# Patient Record
Sex: FEMALE | Race: WHITE | NOT HISPANIC OR LATINO | Employment: FULL TIME | URBAN - METROPOLITAN AREA
[De-identification: names, ages, dates, MRNs, and addresses within clinical notes are randomized per-mention and may not be internally consistent; named-entity substitution may affect disease eponyms.]

---

## 2022-06-06 ENCOUNTER — OFFICE VISIT (OUTPATIENT)
Dept: INTERNAL MEDICINE CLINIC | Facility: CLINIC | Age: 32
End: 2022-06-06
Payer: COMMERCIAL

## 2022-06-06 VITALS
BODY MASS INDEX: 17.97 KG/M2 | SYSTOLIC BLOOD PRESSURE: 114 MMHG | DIASTOLIC BLOOD PRESSURE: 64 MMHG | RESPIRATION RATE: 16 BRPM | HEART RATE: 83 BPM | WEIGHT: 111.8 LBS | HEIGHT: 66 IN | TEMPERATURE: 98.4 F | OXYGEN SATURATION: 99 %

## 2022-06-06 DIAGNOSIS — R59.0 LYMPHADENOPATHY, PREAURICULAR: Primary | ICD-10-CM

## 2022-06-06 DIAGNOSIS — R68.84 JAW PAIN, NON-TMJ: ICD-10-CM

## 2022-06-06 PROCEDURE — 3725F SCREEN DEPRESSION PERFORMED: CPT | Performed by: INTERNAL MEDICINE

## 2022-06-06 PROCEDURE — 1036F TOBACCO NON-USER: CPT | Performed by: INTERNAL MEDICINE

## 2022-06-06 PROCEDURE — 99203 OFFICE O/P NEW LOW 30 MIN: CPT | Performed by: INTERNAL MEDICINE

## 2022-06-06 PROCEDURE — 3008F BODY MASS INDEX DOCD: CPT | Performed by: INTERNAL MEDICINE

## 2022-06-06 RX ORDER — DOXYCYCLINE HYCLATE 20 MG
20 TABLET ORAL 2 TIMES DAILY WITH MEALS
COMMUNITY
Start: 2022-05-15

## 2022-06-06 RX ORDER — MULTIVITAMIN
1 TABLET ORAL DAILY
COMMUNITY

## 2022-06-06 RX ORDER — NORGESTIMATE AND ETHINYL ESTRADIOL
1 KIT DAILY
COMMUNITY
Start: 2022-05-26

## 2022-06-06 NOTE — PROGRESS NOTES
Assessment/Plan:    Problem List Items Addressed This Visit        Immune and Lymphatic    Lymphadenopathy, preauricular - Primary     -chronic L sided tender palpable nodule at TMJ area  -possible LN  -obtain CT  -obtain CBCD, BMP           Relevant Orders    CT facial bones w wo contrast    Basic metabolic panel    CBC and differential       Other    Jaw pain, non-TMJ    Relevant Orders    CT facial bones w wo contrast          Subjective:      Patient ID: Judith Cloud is a 36 y o  female  HPI  36yo female here as a new patient  Has lump on L jaw area  Used to be a dull pain intermittent x1 year  Now constant dull ache, no TMD   Hurts when she presses on it  No swelling, bruxism, tinnitus  She does not take anyting for it, rates it 5 out of 10  No chewing difficulty or talking  Feels that it has enlarged but not significantly over the past 3 months  She is a dentist, 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  The following portions of the patient's history were reviewed and updated as appropriate: allergies, current medications, past family history, past medical history, past social history, past surgical history and problem list       Current Outpatient Medications:     Multiple Vitamin (multivitamin) tablet, Take 1 tablet by mouth daily, Disp: , Rfl:     tretinoin (RETIN-A) 0 025 % cream, APPLY A PEA SIZED AMOUNT EVERY NIGHT AT BEDTIME  WAIT AT LEAST 5 MINUTES AFTER WASHING FACE, Disp: , Rfl:     Tri-Lo-Yamilet 0 18/0 215/0 25 MG-25 MCG per tablet, Take 1 tablet by mouth daily, Disp: , Rfl:     doxycycline (PERIOSTAT) 20 MG tablet, Take 20 mg by mouth 2 (two) times a day with meals (Patient not taking: Reported on 6/6/2022), Disp: , Rfl:     Review of Systems   Constitutional: Negative for appetite change, fever and unexpected weight change  HENT: Negative for dental problem, ear pain, facial swelling, hearing loss, postnasal drip, rhinorrhea, sore throat and tinnitus      Skin: Negative for color change, pallor, rash and wound  Neurological: Negative for numbness and headaches  Hematological: Negative for adenopathy  Objective:    /64   Pulse 83   Temp 98 4 °F (36 9 °C)   Resp 16   Ht 5' 5 5" (1 664 m)   Wt 50 7 kg (111 lb 12 8 oz)   SpO2 99%   BMI 18 32 kg/m²      Physical Exam  Vitals reviewed  Constitutional:       General: She is not in acute distress  Appearance: She is not diaphoretic  HENT:      Head: Normocephalic  Right Ear: Tympanic membrane, ear canal and external ear normal  There is no impacted cerumen  Left Ear: Tympanic membrane, ear canal and external ear normal  There is no impacted cerumen  Nose: Nose normal  No congestion or rhinorrhea  Mouth/Throat:      Mouth: Mucous membranes are moist       Pharynx: Oropharynx is clear  No oropharyngeal exudate or posterior oropharyngeal erythema  Comments: No jaw clicking noted  Chest:   Breasts:      Right: No supraclavicular adenopathy  Left: No supraclavicular adenopathy  Musculoskeletal:      Cervical back: Neck supple  No tenderness  Lymphadenopathy:      Head:      Right side of head: No submental, submandibular, preauricular, posterior auricular or occipital adenopathy  Left side of head: Preauricular (mobile, approx 1cm fluctuant, palpable node, tender to palpation) adenopathy present  No submental, submandibular, posterior auricular or occipital adenopathy  Cervical: No cervical adenopathy  Right cervical: No superficial, deep or posterior cervical adenopathy  Left cervical: No superficial, deep or posterior cervical adenopathy  Upper Body:      Right upper body: No supraclavicular adenopathy  Left upper body: No supraclavicular adenopathy  Neurological:      Mental Status: She is alert and oriented to person, place, and time

## 2022-08-15 ENCOUNTER — OFFICE VISIT (OUTPATIENT)
Dept: OBGYN CLINIC | Facility: CLINIC | Age: 32
End: 2022-08-15
Payer: COMMERCIAL

## 2022-08-15 VITALS
SYSTOLIC BLOOD PRESSURE: 94 MMHG | HEIGHT: 65 IN | WEIGHT: 106 LBS | DIASTOLIC BLOOD PRESSURE: 78 MMHG | BODY MASS INDEX: 17.66 KG/M2

## 2022-08-15 DIAGNOSIS — Z01.419 ROUTINE GYNECOLOGICAL EXAMINATION: Primary | ICD-10-CM

## 2022-08-15 DIAGNOSIS — Z11.3 SCREENING FOR STDS (SEXUALLY TRANSMITTED DISEASES): ICD-10-CM

## 2022-08-15 DIAGNOSIS — Z30.41 ENCOUNTER FOR SURVEILLANCE OF CONTRACEPTIVE PILLS: ICD-10-CM

## 2022-08-15 PROCEDURE — 99385 PREV VISIT NEW AGE 18-39: CPT | Performed by: PHYSICIAN ASSISTANT

## 2022-08-15 PROCEDURE — 87591 N.GONORRHOEAE DNA AMP PROB: CPT | Performed by: PHYSICIAN ASSISTANT

## 2022-08-15 PROCEDURE — 0503F POSTPARTUM CARE VISIT: CPT | Performed by: PHYSICIAN ASSISTANT

## 2022-08-15 PROCEDURE — G0145 SCR C/V CYTO,THINLAYER,RESCR: HCPCS | Performed by: PHYSICIAN ASSISTANT

## 2022-08-15 PROCEDURE — 87491 CHLMYD TRACH DNA AMP PROBE: CPT | Performed by: PHYSICIAN ASSISTANT

## 2022-08-15 RX ORDER — ISOTRETINOIN 30 MG/1
CAPSULE ORAL
COMMUNITY
Start: 2022-07-26

## 2022-08-15 RX ORDER — NORGESTIMATE AND ETHINYL ESTRADIOL
1 KIT DAILY
Qty: 90 TABLET | Refills: 4 | Status: SHIPPED | OUTPATIENT
Start: 2022-08-15

## 2022-08-15 NOTE — PROGRESS NOTES
Patient is here for annual exam   She denies any complaints  LMP: 7/18/22  Periods are regular  Periods last 4 days  Birth control: pills  She has completed the Gardasil series  She is sexually active  She does desire STD testing    Family history of breast, ovarian, uterine cancer or colon cancer: no

## 2022-08-15 NOTE — PROGRESS NOTES
Assessment   36 y o  Jay Barrios presenting for annual exam      Plan   Diagnoses and all orders for this visit:    1  Routine gynecological examination    - Liquid-based pap, screening    Normal findings on routine exam   Encouraged 150 min of exercise per week  Reviewed balanced diet  Multivitamin encouraged   Breast awareness/SBE encouraged       2  Encounter for surveillance of contraceptive pills    - Tri-Lo-Yamilet 0 18/0 215/0 25 MG-25 MCG per tablet; Take 1 tablet by mouth daily  Dispense: 90 tablet; Refill: 4    Withdrawal bleeds are light and regular on current contraceptive  She is happy with this and desires to continue  Aware of symptoms to report  Refill sent to pharmacy on file  3  Screening for STDs (sexually transmitted diseases)    - Chlamydia/GC amplified DNA by PCR  - Hepatitis B surface antigen; Future  - Hepatitis C antibody; Future  - HIV 1/2 Antigen/Antibody (4th Generation) w Reflex [facility]; Future  - RPR; Future        Pap done today      RTO one year for yearly exam or sooner as needed  __________________________________________________________________    Subjective     Maira Triplett is a 36 y o  Jay Barrios presenting for annual exam  She is without complaint and does want STD testing today  Mike Dorado is new to this practice  She is engaged - getting  next month  Small wedding - approx 80 people - she and maría would be happy with court wedding but both are first born children and parents would like formal ceremony/reception  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  SCREENING  Last Pap: Pt reports 2 years ago  Last Mammo: N/A  Last Colonoscopy: N/A      GYN  Gyn hx: no sig PMH reported   Menarche at age 17  Periods are regular every 28-30 days, lasting 4 days  Dysmenorrhea:none     Cyclic symptoms include none    Sexually active: Yes - single partner - male (fiance)  Contraception: Megha Mabry Hx STI: denies     Hx Abnormal pap: denies  We reviewed [facility] guidelines for Pap testing today  Gardasil: She has completed the Gardasil series  Denies vaginal discharge, itching, odor, dyspareunia, pelvic pain and vulvar/vaginal symptoms      OB     Does not desire fertility in near future  Thinking maybe they will freeze eggs to buy time as would prefer to wait a few years and  wants multiple children  Declines referral at this time but will call office if desires in future    Complaints: denies   Denies urgency, frequency, hematuria, leakage / change in stream, difficulty urinating  BREAST  Complaints: denies   Denies: breast lump, breast tenderness, nipple discharge, skin color change, and skin lesion(s)  Personal hx: none reported       Pertinent Family Hx:   Family hx of breast cancer: no  Family hx of ovarian cancer: no  Family hx of colon cancer: no      GENERAL  PMH reviewed/updated and is as below  Patient does follow with a PCP  Past Medical History:   Diagnosis Date    Acne        Past Surgical History:   Procedure Laterality Date    WISDOM TOOTH EXTRACTION           Current Outpatient Medications:     ISOtretinoin (ACCUTANE) 30 MG capsule, TAKE 1 CAPSULE BY MOUTH ONCE A DAY WITH FOOD IN THE MORNING, Disp: , Rfl:     Multiple Vitamin (multivitamin) tablet, Take 1 tablet by mouth daily, Disp: , Rfl:     Tri-Lo-Yamilet 0 18/0 215/0 25 MG-25 MCG per tablet, Take 1 tablet by mouth daily, Disp: 90 tablet, Rfl: 4    No Known Allergies    Social History     Socioeconomic History    Marital status: Single     Spouse name: Not on file    Number of children: Not on file    Years of education: Not on file    Highest education level: Not on file   Occupational History    Not on file   Tobacco Use    Smoking status: Never Smoker    Smokeless tobacco: Never Used   Vaping Use    Vaping Use: Never used   Substance and Sexual Activity    Alcohol use:  Yes Alcohol/week: 1 0 standard drink     Types: 1 Standard drinks or equivalent per week    Drug use: Never    Sexual activity: Yes     Partners: Male     Birth control/protection: Pill   Other Topics Concern    Not on file   Social History Narrative    Dentist     Social Determinants of Health     Financial Resource Strain: Not on file   Food Insecurity: Not on file   Transportation Needs: Not on file   Physical Activity: Not on file   Stress: Not on file   Social Connections: Not on file   Intimate Partner Violence: Not on file   Housing Stability: Not on file       Review of Systems     ROS:  Constitutional: Negative for fatigue and unexpected weight change  Respiratory: Negative for cough and shortness of breath  Cardiovascular: Negative for chest pain and palpitations  Gastrointestinal: Negative for abdominal pain and change in bowel habits  Breasts:  Negative, other than as noted above  Genitourinary: Negative, other than as noted above  Psychiatric: Negative for mood difficulties  Objective      BP 94/78 (BP Location: Left arm, Patient Position: Sitting, Cuff Size: Standard)   Ht 5' 4 75" (1 645 m)   Wt 48 1 kg (106 lb)   LMP 07/18/2022   BMI 17 78 kg/m²     Physical Examination:    Patient appears well and is not in distress  Neck is supple without masses  Breasts are symmetrical without mass, tenderness, nipple discharge, skin changes or adenopathy  Abdomen is soft and nontender without masses  External genitals are normal without lesions or rashes  Urethral meatus and urethra are normal  Bladder is normal to palpation  Vagina is normal without discharge or bleeding  Cervix is normal without discharge or lesion  Uterus is normal, mobile, nontender without palpable mass  Adnexa are normal, nontender, without palpable mass

## 2022-08-18 LAB
C TRACH DNA SPEC QL NAA+PROBE: NEGATIVE
LAB AP GYN PRIMARY INTERPRETATION: NORMAL
Lab: NORMAL
N GONORRHOEA DNA SPEC QL NAA+PROBE: NEGATIVE

## 2022-09-01 LAB
EXTERNAL HIV SCREEN: NORMAL
HCV AB SER-ACNC: NEGATIVE

## 2022-12-08 ENCOUNTER — OFFICE VISIT (OUTPATIENT)
Dept: INTERNAL MEDICINE CLINIC | Facility: CLINIC | Age: 32
End: 2022-12-08

## 2022-12-08 VITALS
SYSTOLIC BLOOD PRESSURE: 100 MMHG | BODY MASS INDEX: 19.33 KG/M2 | HEIGHT: 65 IN | RESPIRATION RATE: 14 BRPM | DIASTOLIC BLOOD PRESSURE: 66 MMHG | OXYGEN SATURATION: 100 % | TEMPERATURE: 97.6 F | HEART RATE: 68 BPM | WEIGHT: 116 LBS

## 2022-12-08 DIAGNOSIS — Z23 ENCOUNTER FOR IMMUNIZATION: ICD-10-CM

## 2022-12-08 DIAGNOSIS — Z13.6 SCREENING FOR CARDIOVASCULAR CONDITION: ICD-10-CM

## 2022-12-08 DIAGNOSIS — Z00.00 ANNUAL PHYSICAL EXAM: Primary | ICD-10-CM

## 2022-12-08 RX ORDER — ISOTRETINOIN 20 MG/1
20 CAPSULE, LIQUID FILLED ORAL
COMMUNITY
Start: 2022-11-14

## 2022-12-08 NOTE — PATIENT INSTRUCTIONS
Wellness Visit for Adults   AMBULATORY CARE:   A wellness visit  is when you see your healthcare provider to get screened for health problems  Your healthcare provider will also give you advice on how to stay healthy  Write down your questions so you remember to ask them  Ask your healthcare provider how often you should have a wellness visit  What happens at a wellness visit:  Your healthcare provider will ask about your health, and your family history of health problems  This includes high blood pressure, heart disease, and cancer  He or she will ask if you have symptoms that concern you, if you smoke, and about your mood  You may also be asked about your intake of medicines, supplements, food, and alcohol  Any of the following may be done:  · Your weight  will be checked  Your height may also be checked so your body mass index (BMI) can be calculated  Your BMI shows if you are at a healthy weight  · Your blood pressure  and heart rate will be checked  Your temperature may also be checked  · Blood and urine tests  may be done  Blood tests may be done to check your cholesterol levels  Abnormal cholesterol levels increase your risk for heart disease and stroke  You may also need a blood or urine test to check for diabetes if you are at increased risk  Urine tests may be done to look for signs of an infection or kidney disease  · A physical exam  includes checking your heartbeat and lungs with a stethoscope  Your healthcare provider may also check your skin to look for sun damage  · Screening tests  may be recommended  A screening test is done to check for diseases that may not cause symptoms  The screening tests you may need depend on your age, gender, family history, and lifestyle habits  For example, colorectal screening may be recommended if you are 48years old or older  Screening tests you need if you are a woman:   · A Pap smear  is used to screen for cervical cancer   Pap smears are usually done every 3 to 5 years depending on your age  You may need them more often if you have had abnormal Pap smear test results in the past  Ask your healthcare provider how often you should have a Pap smear  · A mammogram  is an x-ray of your breasts to screen for breast cancer  Experts recommend mammograms every 2 years starting at age 48 years  You may need a mammogram at age 52 years or younger if you have an increased risk for breast cancer  Talk to your healthcare provider about when you should start having mammograms and how often you need them  Vaccines you may need:   · Get an influenza vaccine  every year  The influenza vaccine protects you from the flu  Several types of viruses cause the flu  The viruses change over time, so new vaccines are made each year  · Get a tetanus-diphtheria (Td) booster vaccine  every 10 years  This vaccine protects you against tetanus and diphtheria  Tetanus is a severe infection that may cause painful muscle spasms and lockjaw  Diphtheria is a severe bacterial infection that causes a thick covering in the back of your mouth and throat  · Get a human papillomavirus (HPV) vaccine  if you are female and aged 23 to 32 or male 23 to 24 and never received it  This vaccine protects you from HPV infection  HPV is the most common infection spread by sexual contact  HPV may also cause vaginal, penile, and anal cancers  · Get a pneumococcal vaccine  if you are aged 72 years or older  The pneumococcal vaccine is an injection given to protect you from pneumococcal disease  Pneumococcal disease is an infection caused by pneumococcal bacteria  The infection may cause pneumonia, meningitis, or an ear infection  · Get a shingles vaccine  if you are 60 or older, even if you have had shingles before  The shingles vaccine is an injection to protect you from the varicella-zoster virus  This is the same virus that causes chickenpox   Shingles is a painful rash that develops in people who had chickenpox or have been exposed to the virus  How to eat healthy:  My Plate is a model for planning healthy meals  It shows the types and amounts of foods that should go on your plate  Fruits and vegetables make up about half of your plate, and grains and protein make up the other half  A serving of dairy is included on the side of your plate  The amount of calories and serving sizes you need depends on your age, gender, weight, and height  Examples of healthy foods are listed below:  · Eat a variety of vegetables  such as dark green, red, and orange vegetables  You can also include canned vegetables low in sodium (salt) and frozen vegetables without added butter or sauces  · Eat a variety of fresh fruits , canned fruit in 100% juice, frozen fruit, and dried fruit  · Include whole grains  At least half of the grains you eat should be whole grains  Examples include whole-wheat bread, wheat pasta, brown rice, and whole-grain cereals such as oatmeal     · Eat a variety of protein foods such as seafood (fish and shellfish), lean meat, and poultry without skin (turkey and chicken)  Examples of lean meats include pork leg, shoulder, or tenderloin, and beef round, sirloin, tenderloin, and extra lean ground beef  Other protein foods include eggs and egg substitutes, beans, peas, soy products, nuts, and seeds  · Choose low-fat dairy products such as skim or 1% milk or low-fat yogurt, cheese, and cottage cheese  · Limit unhealthy fats  such as butter, hard margarine, and shortening  Exercise:  Exercise at least 30 minutes per day on most days of the week  Some examples of exercise include walking, biking, dancing, and swimming  You can also fit in more physical activity by taking the stairs instead of the elevator or parking farther away from stores  Include muscle strengthening activities 2 days each week  Regular exercise provides many health benefits   It helps you manage your weight, and decreases your risk for type 2 diabetes, heart disease, stroke, and high blood pressure  Exercise can also help improve your mood  Ask your healthcare provider about the best exercise plan for you  General health and safety guidelines:   · Do not smoke  Nicotine and other chemicals in cigarettes and cigars can cause lung damage  Ask your healthcare provider for information if you currently smoke and need help to quit  E-cigarettes or smokeless tobacco still contain nicotine  Talk to your healthcare provider before you use these products  · Limit alcohol  A drink of alcohol is 12 ounces of beer, 5 ounces of wine, or 1½ ounces of liquor  · Lose weight, if needed  Being overweight increases your risk of certain health conditions  These include heart disease, high blood pressure, type 2 diabetes, and certain types of cancer  · Protect your skin  Do not sunbathe or use tanning beds  Use sunscreen with a SPF 15 or higher  Apply sunscreen at least 15 minutes before you go outside  Reapply sunscreen every 2 hours  Wear protective clothing, hats, and sunglasses when you are outside  · Drive safely  Always wear your seatbelt  Make sure everyone in your car wears a seatbelt  A seatbelt can save your life if you are in an accident  Do not use your cell phone when you are driving  This could distract you and cause an accident  Pull over if you need to make a call or send a text message  · Practice safe sex  Use latex condoms if are sexually active and have more than one partner  Your healthcare provider may recommend screening tests for sexually transmitted infections (STIs)  · Wear helmets, lifejackets, and protective gear  Always wear a helmet when you ride a bike or motorcycle, go skiing, or play sports that could cause a head injury  Wear protective equipment when you play sports  Wear a lifejacket when you are on a boat or doing water sports      © Copyright [employer] 2022 Information is for End User's use only and may not be sold, redistributed or otherwise used for commercial purposes  All illustrations and images included in CareNotes® are the copyrighted property of [employer]  or [employer]  The above information is an  only  It is not intended as medical advice for individual conditions or treatments  Talk to your doctor, nurse or pharmacist before following any medical regimen to see if it is safe and effective for you  Cholesterol and Your Health   AMBULATORY CARE:   Cholesterol  is a waxy, fat-like substance  Your body uses cholesterol to make hormones and new cells, and to protect nerves  Cholesterol is made by your body  It also comes from certain foods you eat, such as meat and dairy products  Your healthcare provider can help you set goals for your cholesterol levels  He or she can help you create a plan to meet your goals  Cholesterol level goals: Your cholesterol level goals depend on your risk for heart disease, your age, and your other health conditions  The following are general guidelines:  · Total cholesterol  includes low-density lipoprotein (LDL), high-density lipoprotein (HDL), and triglyceride levels  The total cholesterol level should be lower than 200 mg/dL and is best at about 150 mg/dL  · LDL cholesterol  is called bad cholesterol  because it forms plaque in your arteries  As plaque builds up, your arteries become narrow, and less blood flows through  When plaque decreases blood flow to your heart, you may have chest pain  If plaque completely blocks an artery that brings blood to your heart, you may have a heart attack  Plaque can break off and form blood clots  Blood clots may block arteries in your brain and cause a stroke  The level should be less than 130 mg/dL and is best at about 100 mg/dL  · HDL cholesterol  is called good cholesterol  because it helps remove LDL cholesterol from your arteries   It does this by attaching to LDL cholesterol and carrying it to your liver  Your liver breaks down LDL cholesterol so your body can get rid of it  High levels of HDL cholesterol can help prevent a heart attack and stroke  Low levels of HDL cholesterol can increase your risk for heart disease, heart attack, and stroke  The level should be 60 mg/dL or higher  · Triglycerides  are a type of fat that store energy from foods you eat  High levels of triglycerides also cause plaque buildup  This can increase your risk for a heart attack or stroke  If your triglyceride level is high, your LDL cholesterol level may also be high  The level should be less than 150 mg/dL  Any of the following can increase your risk for high cholesterol:   · Smoking cigarettes    · Being overweight or obese, or not getting enough exercise    · Drinking large amounts of alcohol    · A medical condition such as hypertension (high blood pressure) or diabetes    · Certain genes passed from your parents to you    · Age older than 65 years    What you need to know about having your cholesterol levels checked: Adults 21to 39years of age should have their cholesterol levels checked every 4 to 6 years  Adults 45 years or older should have their cholesterol checked every 1 to 2 years  You may need your cholesterol checked more often, or at a younger age, if you have risk factors for heart disease  You may also need to have your cholesterol checked more often if you have other health conditions, such as diabetes  Blood tests are used to check cholesterol levels  Blood tests measure your levels of triglycerides, LDL cholesterol, and HDL cholesterol  How healthy fats affect your cholesterol levels:  Healthy fats, also called unsaturated fats, help lower LDL cholesterol and triglyceride levels  Healthy fats include the following:  · Monounsaturated fats  are found in foods such as olive oil, canola oil, avocado, nuts, and olives      · Polyunsaturated fats,  such as omega 3 fats, are found in fish, such as salmon, trout, and tuna  They can also be found in plant foods such as flaxseed, walnuts, and soybeans  How unhealthy fats affect your cholesterol levels:  Unhealthy fats increase LDL cholesterol and triglyceride levels  They are found in foods high in cholesterol, saturated fat, and trans fat:  · Cholesterol  is found in eggs, dairy, and meat  · Saturated fat  is found in butter, cheese, ice cream, whole milk, and coconut oil  Saturated fat is also found in meat, such as sausage, hot dogs, and bologna  · Trans fat  is found in liquid oils and is used in fried and baked foods  Foods that contain trans fats include chips, crackers, muffins, sweet rolls, microwave popcorn, and cookies  Treatment  for high cholesterol will also decrease your risk of heart disease, heart attack, and stroke  Treatment may include any of the following:  · Lifestyle changes  may include food, exercise, weight loss, and quitting smoking  You may also need to decrease the amount of alcohol you drink  Your healthcare provider will want you to start with lifestyle changes  Other treatment may be added if lifestyle changes are not enough  Your healthcare provider may recommend you work with a team to manage hyperlipidemia  The team may include medical experts such as a dietitian, an exercise or physical therapist, and a behavior therapist  Your family members may be included in helping you create lifestyle changes  · Medicines  may be given to lower your LDL cholesterol, triglyceride levels, or total cholesterol level  You may need medicines to lower your cholesterol if any of the following is true:    ? You have a history of stroke, TIA, unstable angina, or a heart attack  ? Your LDL cholesterol level is 190 mg/dL or higher  ? You are age 36 to 76 years, have diabetes or heart disease risk factors, and your LDL cholesterol is 70 mg/dL or higher      · Supplements  include fish oil, red yeast rice, and garlic  Fish oil may help lower your triglyceride and LDL cholesterol levels  It may also increase your HDL cholesterol level  Red yeast rice may help decrease your total cholesterol level and LDL cholesterol level  Garlic may help lower your total cholesterol level  Do not take any supplements without talking to your healthcare provider  Food changes you can make to lower your cholesterol levels:  A dietitian can help you create a healthy eating plan  He or she can show you how to read food labels and choose foods low in saturated fat, trans fats, and cholesterol  · Decrease the total amount of fat you eat  Choose lean meats, fat-free or 1% fat milk, and low-fat dairy products, such as yogurt and cheese  Try to limit or avoid red meats  Limit or do not eat fried foods or baked goods, such as cookies  · Replace unhealthy fats with healthy fats  Cook foods in olive oil or canola oil  Choose soft margarines that are low in saturated fat and trans fat  Seeds, nuts, and avocados are other examples of healthy fats  · Eat foods with omega-3 fats  Examples include salmon, tuna, mackerel, walnuts, and flaxseed  Eat fish 2 times per week  Pregnant women should not eat fish that have high levels of mercury, such as shark, swordfish, and valery mackerel  · Increase the amount of high-fiber foods you eat  High-fiber foods can help lower your LDL cholesterol  Aim to get between 20 and 30 grams of fiber each day  Fruits and vegetables are high in fiber  Eat at least 5 servings each day  Other high-fiber foods are whole-grain or whole-wheat breads, pastas, or cereals, and brown rice  Eat 3 ounces of whole-grain foods each day  Increase fiber slowly  You may have abdominal discomfort, bloating, and gas if you add fiber to your diet too quickly  · Eat healthy protein foods  Examples include low-fat dairy products, skinless chicken and turkey, fish, and nuts      · Limit foods and drinks that are high in sugar  Your dietitian or healthcare provider can help you create daily limits for high-sugar foods and drinks  The limit may be lower if you have diabetes or another health condition  Limits can also help you lose weight if needed  Lifestyle changes you can make to lower your cholesterol levels:   · Maintain a healthy weight  Ask your healthcare provider what a healthy weight is for you  Ask him or her to help you create a weight loss plan if needed  Weight loss can decrease your total cholesterol and triglyceride levels  Weight loss may also help keep your blood pressure at a healthy level  · Be physically active throughout the day  Physical activity, such as exercise, can help lower your total cholesterol level and maintain a healthy weight  Physical activity can also help increase your HDL cholesterol level  Work with your healthcare provider to create an program that is right for you  Get at least 30 to 40 minutes of moderate physical activity most days of the week  Examples of exercise include brisk walking, swimming, or biking  Also include strength training at least 2 times each week  Your healthcare providers can help you create a physical activity plan  · Do not smoke  Nicotine and other chemicals in cigarettes and cigars can raise your cholesterol levels  Ask your healthcare provider for information if you currently smoke and need help to quit  E-cigarettes or smokeless tobacco still contain nicotine  Talk to your healthcare provider before you use these products  · Limit or do not drink alcohol  Alcohol can increase your triglyceride levels  Ask your healthcare provider before you drink alcohol  Ask how much is okay for you to drink in 24 hours or 1 week  Follow up with your doctor as directed:  Write down your questions so you remember to ask them during your visits    © Copyright [employer] 2022 Information is for End User's use only and may not be sold, redistributed or otherwise used for commercial purposes  All illustrations and images included in CareNotes® are the copyrighted property of [employer]  or [employer]  The above information is an  only  It is not intended as medical advice for individual conditions or treatments  Talk to your doctor, nurse or pharmacist before following any medical regimen to see if it is safe and effective for you

## 2022-12-08 NOTE — PROGRESS NOTES
ADULT ANNUAL [address] INTERNAL MEDICINE    NAME: Cassidy Arrington  AGE: 36 y o  SEX: female  : 1990     DATE: 2022     Assessment and Plan:     Problem List Items Addressed This Visit    None  Visit Diagnoses     Annual physical exam    -  Primary    Screening for cardiovascular condition        Relevant Orders    Lipid panel    Encounter for immunization        Relevant Orders    influenza vaccine, quadrivalent, 0 5 mL, preservative-free, for adult and pediatric patients 6 mos+ (AFLURIA, FLUARIX, FLULAVAL, FLUZONE) (Completed)          Immunizations and preventive care screenings were discussed with patient today  Appropriate education was printed on patient's after visit summary  Counseling:  Alcohol/drug use: discussed moderation in alcohol intake, the recommendations for healthy alcohol use, and avoidance of illicit drug use  Dental Health: discussed importance of regular tooth brushing, flossing, and dental visits  Exercise: the importance of regular exercise/physical activity was discussed  Recommend exercise 3-5 times per week for at least 30 minutes  Immunizations discussed  Influenza vaccine due today  Consider COVID bivalent booster  Received Gardasil and tdap  Cancer screenings discussed  PAP per GYN  Return in about 1 year (around 2023) for Annual physical      Chief Complaint:     No chief complaint on file  History of Present Illness:     Adult Annual Physical   Patient here for a comprehensive physical exam  The patient reports she has not gone for CT, has persistent soft nodule L preauricular region  She got  in Sept     Diet and Physical Activity  Diet/Nutrition: well balanced diet  Exercise: no formal exercise  Depression Screening  PHQ-2/9 Depression Screening         General Health  Sleep: sleeps well  Hearing: normal - none     Vision: most recent eye exam >1 year ago and wears glasses  Dental: regular dental visits  /GYN Health  Last menstrual period: 12/8/22  Contraceptive method: oral contraceptives  History of STDs?: no      Review of Systems:     Review of Systems   Constitutional: Positive for unexpected weight change  Negative for activity change and appetite change  HENT: Negative for congestion, postnasal drip, rhinorrhea, sore throat, trouble swallowing and voice change  Eyes: Positive for visual disturbance  Respiratory: Negative for cough, shortness of breath and wheezing  Cardiovascular: Negative for chest pain, palpitations and leg swelling  Gastrointestinal: Negative for abdominal pain, constipation, diarrhea, nausea and vomiting  Denies reflux   Genitourinary: Negative for difficulty urinating and menstrual problem  Musculoskeletal: Positive for back pain, neck pain and neck stiffness  Negative for arthralgias  Neurological: Negative for dizziness, numbness and headaches  Hematological: Positive for adenopathy  Psychiatric/Behavioral: Negative for dysphoric mood and sleep disturbance  The patient is not nervous/anxious  Past Medical History:     Past Medical History:   Diagnosis Date   • Acne       Past Surgical History:     Past Surgical History:   Procedure Laterality Date   • WISDOM TOOTH EXTRACTION        Social History:     Social History     Socioeconomic History   • Marital status: Single     Spouse name: None   • Number of children: None   • Years of education: None   • Highest education level: None   Occupational History   • None   Tobacco Use   • Smoking status: Never   • Smokeless tobacco: Never   Vaping Use   • Vaping Use: Never used   Substance and Sexual Activity   • Alcohol use:  Yes     Alcohol/week: 1 0 standard drink     Types: 1 Standard drinks or equivalent per week   • Drug use: Never   • Sexual activity: Yes     Partners: Male     Birth control/protection: Pill   Other Topics Concern   • None   Social History Narrative    Dentist     Social Determinants of Health     Financial Resource Strain: Not on file   Food Insecurity: Not on file   Transportation Needs: Not on file   Physical Activity: Not on file   Stress: Not on file   Social Connections: Not on file   Intimate Partner Violence: Not on file   Housing Stability: Not on file      Family History:     Family History   Problem Relation Age of Onset   • No Known Problems Mother    • No Known Problems Father    • Diabetes type I Paternal Grandmother    • Ovarian cancer Cousin 40        maternal side  Current Medications:     Current Outpatient Medications   Medication Sig Dispense Refill   • Claravis 20 MG capsule Take 20 mg by mouth daily with dinner     • ISOtretinoin (ACCUTANE) 30 MG capsule TAKE 1 CAPSULE BY MOUTH ONCE A DAY WITH FOOD IN THE MORNING     • Multiple Vitamin (multivitamin) tablet Take 1 tablet by mouth daily     • Tri-Lo-Yamilet 0 18/0 215/0 25 MG-25 MCG per tablet Take 1 tablet by mouth daily 90 tablet 4     No current facility-administered medications for this visit  Allergies:     No Known Allergies   Physical Exam:     /66   Pulse 68   Temp 97 6 °F (36 4 °C)   Resp 14   Ht 5' 5 25" (1 657 m) Comment: with shoes  Wt 52 6 kg (116 lb)   SpO2 100%   BMI 19 16 kg/m²     Physical Exam  Vitals reviewed  Constitutional:       General: She is not in acute distress  Appearance: She is normal weight  HENT:      Head: Normocephalic  Right Ear: Tympanic membrane and ear canal normal  There is no impacted cerumen  Left Ear: Tympanic membrane and ear canal normal  There is no impacted cerumen  Nose: Nose normal       Mouth/Throat:      Mouth: Mucous membranes are moist       Pharynx: Oropharynx is clear  No oropharyngeal exudate or posterior oropharyngeal erythema  Eyes:      Extraocular Movements: Extraocular movements intact        Conjunctiva/sclera: Conjunctivae normal       Pupils: Pupils are equal, round, and reactive to light  Comments: Wears glasses   Neck:      Thyroid: No thyromegaly or thyroid tenderness  Cardiovascular:      Rate and Rhythm: Normal rate and regular rhythm  Pulses: Normal pulses  Heart sounds: Normal heart sounds  Pulmonary:      Effort: Pulmonary effort is normal  No respiratory distress  Breath sounds: Normal breath sounds  No wheezing  Abdominal:      General: Abdomen is flat  Bowel sounds are normal  There is no distension  Palpations: Abdomen is soft  Tenderness: There is no abdominal tenderness  Musculoskeletal:      Cervical back: Neck supple  No tenderness  Right lower leg: No edema  Left lower leg: No edema  Lymphadenopathy:      Head:      Left side of head: Preauricular adenopathy present  Skin:     Coloration: Skin is not pale  Neurological:      General: No focal deficit present  Mental Status: She is alert and oriented to person, place, and time     Psychiatric:         Mood and Affect: Mood normal           Kaitlynn [address] [place] [address] INTERNAL MEDICINE

## 2023-05-16 ENCOUNTER — TELEPHONE (OUTPATIENT)
Dept: DERMATOLOGY | Facility: CLINIC | Age: 33
End: 2023-05-16

## 2023-05-16 NOTE — TELEPHONE ENCOUNTER
Called pt in regards of setting up an appt  Per dr Yris Flores, we can offer the pt 5/18 at 10:40 am at [facility]), or 11:30 am on any day with dr Yris Flores (overbook)  can also offer her openings in [facility] if she prefers

## 2023-07-28 ENCOUNTER — HOSPITAL ENCOUNTER (OUTPATIENT)
Dept: RADIOLOGY | Facility: HOSPITAL | Age: 33
Discharge: HOME | End: 2023-07-28
Payer: COMMERCIAL

## 2023-07-28 DIAGNOSIS — R59.0 LYMPHADENOPATHY, PREAURICULAR: ICD-10-CM

## 2023-07-28 DIAGNOSIS — R68.84 JAW PAIN, NON-TMJ: ICD-10-CM

## 2023-07-28 PROCEDURE — G1004 CDSM NDSC: HCPCS

## 2023-07-28 PROCEDURE — 70487 CT MAXILLOFACIAL W/DYE: CPT

## 2023-07-28 RX ADMIN — IOHEXOL 85 ML: 350 INJECTION, SOLUTION INTRAVENOUS at 14:54

## 2024-02-05 ENCOUNTER — OFFICE VISIT (OUTPATIENT)
Dept: INTERNAL MEDICINE CLINIC | Facility: CLINIC | Age: 34
End: 2024-02-05
Payer: COMMERCIAL

## 2024-02-05 VITALS
DIASTOLIC BLOOD PRESSURE: 70 MMHG | SYSTOLIC BLOOD PRESSURE: 104 MMHG | OXYGEN SATURATION: 99 % | HEART RATE: 92 BPM | HEIGHT: 65 IN | BODY MASS INDEX: 18.66 KG/M2 | WEIGHT: 112 LBS | RESPIRATION RATE: 16 BRPM

## 2024-02-05 DIAGNOSIS — Z30.41 ENCOUNTER FOR SURVEILLANCE OF CONTRACEPTIVE PILLS: ICD-10-CM

## 2024-02-05 DIAGNOSIS — Z00.00 ANNUAL PHYSICAL EXAM: Primary | ICD-10-CM

## 2024-02-05 DIAGNOSIS — Z13.6 SCREENING FOR CARDIOVASCULAR CONDITION: ICD-10-CM

## 2024-02-05 PROCEDURE — 99395 PREV VISIT EST AGE 18-39: CPT | Performed by: INTERNAL MEDICINE

## 2024-02-05 RX ORDER — NORGESTIMATE AND ETHINYL ESTRADIOL
1 KIT DAILY
Qty: 90 TABLET | Refills: 0 | Status: SHIPPED | OUTPATIENT
Start: 2024-02-05

## 2024-02-05 NOTE — PATIENT INSTRUCTIONS
Wellness Visit for Adults   AMBULATORY CARE:   A wellness visit  is when you see your healthcare provider to get screened for health problems. Your healthcare provider will also give you advice on how to stay healthy. Write down your questions so you remember to ask them. Ask your healthcare provider how often you should have a wellness visit.  What happens at a wellness visit:  Your healthcare provider will ask about your health, and your family history of health problems. This includes high blood pressure, heart disease, and cancer. He or she will ask if you have symptoms that concern you, if you smoke, and about your mood. You may also be asked about your intake of medicines, supplements, food, and alcohol. Any of the following may be done:  Your weight  will be checked. Your height may also be checked so your body mass index (BMI) can be calculated. Your BMI shows if you are at a healthy weight.    Your blood pressure  and heart rate will be checked. Your temperature may also be checked.    Blood and urine tests  may be done. Blood tests may be done to check your cholesterol levels. Abnormal cholesterol levels increase your risk for heart disease and stroke. You may also need a blood or urine test to check for diabetes if you are at increased risk. Urine tests may be done to look for signs of an infection or kidney disease.    A physical exam  includes checking your heartbeat and lungs with a stethoscope. Your healthcare provider may also check your skin to look for sun damage.    Screening tests  may be recommended. A screening test is done to check for diseases that may not cause symptoms. The screening tests you may need depend on your age, gender, family history, and lifestyle habits. For example, colorectal screening may be recommended if you are 50 years old or older.    Screening tests you need if you are a woman:   A Pap smear  is used to screen for cervical cancer. Pap smears are usually done every 3 to  5 years depending on your age. You may need them more often if you have had abnormal Pap smear test results in the past. Ask your healthcare provider how often you should have a Pap smear.    A mammogram  is an x-ray of your breasts to screen for breast cancer. Experts recommend mammograms every 2 years starting at age 50 years. You may need a mammogram at age 49 years or younger if you have an increased risk for breast cancer. Talk to your healthcare provider about when you should start having mammograms and how often you need them.    Vaccines you may need:   Get an influenza vaccine  every year. The influenza vaccine protects you from the flu. Several types of viruses cause the flu. The viruses change over time, so new vaccines are made each year.    Get a tetanus-diphtheria (Td) booster vaccine  every 10 years. This vaccine protects you against tetanus and diphtheria. Tetanus is a severe infection that may cause painful muscle spasms and lockjaw. Diphtheria is a severe bacterial infection that causes a thick covering in the back of your mouth and throat.    Get a human papillomavirus (HPV) vaccine  if you are female and aged 19 to 26 or male 19 to 21 and never received it. This vaccine protects you from HPV infection. HPV is the most common infection spread by sexual contact. HPV may also cause vaginal, penile, and anal cancers.    Get a pneumococcal vaccine  if you are aged 65 years or older. The pneumococcal vaccine is an injection given to protect you from pneumococcal disease. Pneumococcal disease is an infection caused by pneumococcal bacteria. The infection may cause pneumonia, meningitis, or an ear infection.    Get a shingles vaccine  if you are 60 or older, even if you have had shingles before. The shingles vaccine is an injection to protect you from the varicella-zoster virus. This is the same virus that causes chickenpox. Shingles is a painful rash that develops in people who had chickenpox or have  been exposed to the virus.    How to eat healthy:  My Plate is a model for planning healthy meals. It shows the types and amounts of foods that should go on your plate. Fruits and vegetables make up about half of your plate, and grains and protein make up the other half. A serving of dairy is included on the side of your plate. The amount of calories and serving sizes you need depends on your age, gender, weight, and height. Examples of healthy foods are listed below:  Eat a variety of vegetables  such as dark green, red, and orange vegetables. You can also include canned vegetables low in sodium (salt) and frozen vegetables without added butter or sauces.    Eat a variety of fresh fruits , canned fruit in 100% juice, frozen fruit, and dried fruit.    Include whole grains.  At least half of the grains you eat should be whole grains. Examples include whole-wheat bread, wheat pasta, brown rice, and whole-grain cereals such as oatmeal.    Eat a variety of protein foods such as seafood (fish and shellfish), lean meat, and poultry without skin (turkey and chicken). Examples of lean meats include pork leg, shoulder, or tenderloin, and beef round, sirloin, tenderloin, and extra lean ground beef. Other protein foods include eggs and egg substitutes, beans, peas, soy products, nuts, and seeds.    Choose low-fat dairy products such as skim or 1% milk or low-fat yogurt, cheese, and cottage cheese.    Limit unhealthy fats  such as butter, hard margarine, and shortening.       Exercise:  Exercise at least 30 minutes per day on most days of the week. Some examples of exercise include walking, biking, dancing, and swimming. You can also fit in more physical activity by taking the stairs instead of the elevator or parking farther away from stores. Include muscle strengthening activities 2 days each week. Regular exercise provides many health benefits. It helps you manage your weight, and decreases your risk for type 2 diabetes,  heart disease, stroke, and high blood pressure. Exercise can also help improve your mood. Ask your healthcare provider about the best exercise plan for you.       General health and safety guidelines:   Do not smoke.  Nicotine and other chemicals in cigarettes and cigars can cause lung damage. Ask your healthcare provider for information if you currently smoke and need help to quit. E-cigarettes or smokeless tobacco still contain nicotine. Talk to your healthcare provider before you use these products.    Limit alcohol.  A drink of alcohol is 12 ounces of beer, 5 ounces of wine, or 1½ ounces of liquor.    Lose weight, if needed.  Being overweight increases your risk of certain health conditions. These include heart disease, high blood pressure, type 2 diabetes, and certain types of cancer.    Protect your skin.  Do not sunbathe or use tanning beds. Use sunscreen with a SPF 15 or higher. Apply sunscreen at least 15 minutes before you go outside. Reapply sunscreen every 2 hours. Wear protective clothing, hats, and sunglasses when you are outside.    Drive safely.  Always wear your seatbelt. Make sure everyone in your car wears a seatbelt. A seatbelt can save your life if you are in an accident. Do not use your cell phone when you are driving. This could distract you and cause an accident. Pull over if you need to make a call or send a text message.    Practice safe sex.  Use latex condoms if are sexually active and have more than one partner. Your healthcare provider may recommend screening tests for sexually transmitted infections (STIs).    Wear helmets, lifejackets, and protective gear.  Always wear a helmet when you ride a bike or motorcycle, go skiing, or play sports that could cause a head injury. Wear protective equipment when you play sports. Wear a lifejacket when you are on a boat or doing water sports.    © Copyright [employer] 2023 Information is for End User's use only and may not be sold, redistributed or  otherwise used for commercial purposes.  The above information is an  only. It is not intended as medical advice for individual conditions or treatments. Talk to your doctor, nurse or pharmacist before following any medical regimen to see if it is safe and effective for you.    Cholesterol and Your Health   AMBULATORY CARE:   Cholesterol  is a waxy, fat-like substance. Your body uses cholesterol to make hormones and new cells, and to protect nerves. Cholesterol is made by your body. It also comes from certain foods you eat, such as meat and dairy products. Your healthcare provider can help you set goals for your cholesterol levels. Your provider can help you create a plan to meet your goals.  Cholesterol level goals:  Your cholesterol level goals depend on your risk for heart disease, your age, and your other health conditions. The following are general guidelines:  Total cholesterol  includes low-density lipoprotein (LDL), high-density lipoprotein (HDL), and triglyceride levels. The total cholesterol level should be lower than 200 mg/dL and is best at about 150 mg/dL.    LDL cholesterol  is called bad cholesterol  because it forms plaque in your arteries. As plaque builds up, your arteries become narrow, and less blood flows through. When plaque decreases blood flow to your heart, you may have chest pain. If plaque completely blocks an artery that brings blood to your heart, you may have a heart attack. Plaque can break off and form blood clots. Blood clots may block arteries in your brain and cause a stroke. The level should be less than 130 mg/dL and is best at about 100 mg/dL.         HDL cholesterol  is called good cholesterol  because it helps remove LDL cholesterol from your arteries. It does this by attaching to LDL cholesterol and carrying it to your liver. Your liver breaks down LDL cholesterol so your body can get rid of it. High levels of HDL cholesterol can help prevent a heart attack and  stroke. Low levels of HDL cholesterol can increase your risk for heart disease, heart attack, and stroke. The level should be at least 40 mg/dL in males or at least 50 mg/dL in females.    Triglycerides  are a type of fat that store energy from foods you eat. High levels of triglycerides also cause plaque buildup. This can increase your risk for a heart attack or stroke. If your triglyceride level is high, your LDL cholesterol level may also be high. The level should be less than 150 mg/dL.    Any of the following can increase your risk for high cholesterol:   Smoking or drinking large amounts of alcohol    Having overweight or obesity, or not getting enough exercise    A medical condition such as hypertension (high blood pressure) or diabetes    A family history of high cholesterol    Age older than 65    What you need to know about having your cholesterol levels checked:  Adults 20 to 45 years of age should have their cholesterol levels checked every 4 to 6 years. Adults 45 years or older should have their cholesterol checked every 1 to 2 years. You may need your cholesterol checked more often, or at a younger age, if you have risk factors for heart disease. You may also need to have your cholesterol checked more often if you have other health conditions, such as diabetes. Blood tests are used to check cholesterol levels. Blood tests measure your levels of triglycerides, LDL cholesterol, and HDL cholesterol.  How healthy fats affect your cholesterol levels:  Healthy fats, also called unsaturated fats, help lower LDL cholesterol and triglyceride levels. Healthy fats include the following:  Monounsaturated fats  are found in foods such as olive oil, canola oil, avocado, nuts, and olives.    Polyunsaturated fats,  such as omega 3 fats, are found in fish, such as salmon, trout, and tuna. They can also be found in plant foods such as flaxseed, walnuts, and soybeans.    How unhealthy fats affect your cholesterol levels:   Unhealthy fats increase LDL cholesterol and triglyceride levels. They are found in foods high in cholesterol, saturated fat, and trans fat:  Cholesterol  is found in eggs, dairy, and meat.    Saturated fat  is found in butter, cheese, ice cream, whole milk, and coconut oil. Saturated fat is also found in meat, such as sausage, hot dogs, and bologna.    Trans fat  is found in liquid oils and is used in fried and baked foods. Foods that contain trans fats include chips, crackers, muffins, sweet rolls, microwave popcorn, and cookies.    Treatment  for high cholesterol will also decrease your risk of heart disease, heart attack, and stroke. Treatment may include any of the following:  Lifestyle changes  may include food, exercise, weight loss, and quitting smoking. You may also need to decrease the amount of alcohol you drink. Your healthcare provider will want you to start with lifestyle changes. Other treatment may be added if lifestyle changes are not enough. Your healthcare provider may recommend you work with a team to manage hyperlipidemia. The team may include medical experts such as a dietitian, an exercise or physical therapist, and a behavior therapist. Your family members may be included in helping you create lifestyle changes.    Medicines  may be given to lower your LDL cholesterol, triglyceride levels, or total cholesterol level. You may need medicines to lower your cholesterol if any of the following is true:    You have a history of stroke, TIA, unstable angina, or a heart attack.    Your LDL cholesterol level is 190 mg/dL or higher.    You are age 40 to 75 years, have diabetes or heart disease risk factors, and your LDL cholesterol is 70 mg/dL or higher.    Supplements  include fish oil, red yeast rice, and garlic. Fish oil may help lower your triglyceride and LDL cholesterol levels. It may also increase your HDL cholesterol level. Red yeast rice may help decrease your total cholesterol level and LDL  cholesterol level. Garlic may help lower your total cholesterol level. Do not take any supplements without talking to your healthcare provider.    Food changes you can make to lower your cholesterol levels:  A dietitian can help you create a healthy eating plan. Your dietitian can show you how to read food labels and choose foods low in saturated fat, trans fats, and cholesterol.     Decrease the total amount of fat you eat.  Choose lean meats, fat-free or 1% fat milk, and low-fat dairy products, such as yogurt and cheese. Try to limit or avoid red meats. Limit or do not eat fried foods or baked goods, such as cookies.    Replace unhealthy fats with healthy fats.  Cook foods in olive oil or canola oil. Choose soft margarines that are low in saturated fat and trans fat. Seeds, nuts, and avocados are other examples of healthy fats.    Eat foods with omega-3 fats.  Examples include salmon, tuna, mackerel, walnuts, and flaxseed. Eat fish 2 times per week. Pregnant women should not eat fish that have high levels of mercury, such as shark, swordfish, and valery mackerel.         Increase the amount of high-fiber foods you eat.  High-fiber foods can help lower your LDL cholesterol. Aim to get between 20 and 30 grams of fiber each day. Fruits and vegetables are high in fiber. Eat at least 5 servings each day. Other high-fiber foods are whole-grain or whole-wheat breads, pastas, or cereals, and brown rice. Eat 3 ounces of whole-grain foods each day. Increase fiber slowly. You may have abdominal discomfort, bloating, and gas if you add fiber to your diet too quickly.         Eat healthy protein foods.  Examples include low-fat dairy products, skinless chicken and turkey, fish, and nuts.    Limit foods and drinks that are high in sugar.  Your dietitian or healthcare provider can help you create daily limits for high-sugar foods and drinks. The limit may be lower if you have diabetes or another health condition. Limits can also  help you lose weight if needed.  Lifestyle changes you can make to lower your cholesterol levels:   Maintain a healthy weight.  Ask your healthcare provider what a healthy weight is for you. Ask your provider to help you create a weight loss plan if needed. Weight loss can decrease your total cholesterol and triglyceride levels. Weight loss may also help keep your blood pressure at a healthy level.    Be physically active throughout the day.  Physical activity, such as exercise, can help lower your total cholesterol level and maintain a healthy weight. Physical activity can also help increase your HDL cholesterol level. Work with your healthcare provider to create an program that is right for you. Get at least 30 to 40 minutes of moderate physical activity most days of the week. Examples of exercise include brisk walking, swimming, or biking. Also include strength training at least 2 times each week. Your healthcare providers can help you create a physical activity plan.            Do not smoke.  Nicotine and other chemicals in cigarettes and cigars can raise your cholesterol levels. Ask your healthcare provider for information if you currently smoke and need help to quit. E-cigarettes or smokeless tobacco still contain nicotine. Talk to your healthcare provider before you use these products.         Limit or do not drink alcohol.  Alcohol can increase your triglyceride levels. Ask your healthcare provider before you drink alcohol. Ask how much is okay for you to drink in 24 hours or 1 week.    Follow up with your doctor as directed:  Write down your questions so you remember to ask them during your visits.  © Copyright [employer] 2023 Information is for End User's use only and may not be sold, redistributed or otherwise used for commercial purposes.  The above information is an  only. It is not intended as medical advice for individual conditions or treatments. Talk to your doctor, nurse or pharmacist  before following any medical regimen to see if it is safe and effective for you.

## 2024-05-01 DIAGNOSIS — Z30.41 ENCOUNTER FOR SURVEILLANCE OF CONTRACEPTIVE PILLS: ICD-10-CM

## 2024-05-01 RX ORDER — NORGESTIMATE AND ETHINYL ESTRADIOL
1 KIT DAILY
Qty: 84 TABLET | Refills: 1 | Status: SHIPPED | OUTPATIENT
Start: 2024-05-01

## 2024-11-04 DIAGNOSIS — Z30.41 ENCOUNTER FOR SURVEILLANCE OF CONTRACEPTIVE PILLS: ICD-10-CM

## 2024-11-05 RX ORDER — NORGESTIMATE AND ETHINYL ESTRADIOL
1 KIT DAILY
Qty: 84 TABLET | Refills: 1 | Status: SHIPPED | OUTPATIENT
Start: 2024-11-05

## 2025-04-23 DIAGNOSIS — Z30.41 ENCOUNTER FOR SURVEILLANCE OF CONTRACEPTIVE PILLS: ICD-10-CM

## 2025-04-24 RX ORDER — NORGESTIMATE AND ETHINYL ESTRADIOL
1 KIT DAILY
Qty: 84 TABLET | Refills: 1 | OUTPATIENT
Start: 2025-04-24
